# Patient Record
Sex: MALE | Race: OTHER | HISPANIC OR LATINO | URBAN - METROPOLITAN AREA
[De-identification: names, ages, dates, MRNs, and addresses within clinical notes are randomized per-mention and may not be internally consistent; named-entity substitution may affect disease eponyms.]

---

## 2020-03-31 ENCOUNTER — EMERGENCY (EMERGENCY)
Facility: HOSPITAL | Age: 45
LOS: 0 days | Discharge: HOME | End: 2020-03-31
Admitting: EMERGENCY MEDICINE
Payer: SUBSIDIZED

## 2020-03-31 VITALS — HEART RATE: 115 BPM | SYSTOLIC BLOOD PRESSURE: 132 MMHG | DIASTOLIC BLOOD PRESSURE: 89 MMHG

## 2020-03-31 VITALS
OXYGEN SATURATION: 95 % | HEART RATE: 128 BPM | TEMPERATURE: 102 F | DIASTOLIC BLOOD PRESSURE: 99 MMHG | SYSTOLIC BLOOD PRESSURE: 148 MMHG | RESPIRATION RATE: 18 BRPM

## 2020-03-31 DIAGNOSIS — B34.9 VIRAL INFECTION, UNSPECIFIED: ICD-10-CM

## 2020-03-31 DIAGNOSIS — R05 COUGH: ICD-10-CM

## 2020-03-31 PROCEDURE — 71045 X-RAY EXAM CHEST 1 VIEW: CPT | Mod: 26

## 2020-03-31 PROCEDURE — 99284 EMERGENCY DEPT VISIT MOD MDM: CPT

## 2020-03-31 RX ORDER — ACETAMINOPHEN 500 MG
650 TABLET ORAL ONCE
Refills: 0 | Status: COMPLETED | OUTPATIENT
Start: 2020-03-31 | End: 2020-03-31

## 2020-03-31 RX ADMIN — Medication 650 MILLIGRAM(S): at 16:59

## 2020-03-31 NOTE — ED PROVIDER NOTE - PATIENT PORTAL LINK FT
You can access the FollowMyHealth Patient Portal offered by [facility] by registering at the following website: [email or web address]. By joining [employer]’s FollowMyHealth portal, you will also be able to view your health information using other applications (apps) compatible with our system. You can access the FollowMyHealth Patient Portal offered by [facility] by registering at the following website: [email or web address]. By joining [employer]’s FollowMyHealth portal, you will also be able to view your health information using other applications (apps) compatible with our system.

## 2020-03-31 NOTE — ED PROVIDER NOTE - NS ED ROS FT
CONST: see HPI  ENT: No ear pain or discharge, nasal discharge or congestion. No sore throat  CARD: No chest pain, palpitations  RESP: No SOB, cough  GI: No abdominal pain, N/V/D  MS: No joint pain, back pain or extremity pain/injury  SKIN: No rashes  NEURO: No headache, dizziness, paresthesias or LOC

## 2020-03-31 NOTE — ED PROVIDER NOTE - CLINICAL SUMMARY MEDICAL DECISION MAKING FREE TEXT BOX
43yo male presents with fever, cough, ageusia, x 8 days and under-dosing advil. No SOB, no CP. No tachypnea. Well-appearing on exam. Advised continued self-isolation for an additional 7 days and educated on proper dosage of tylenol/advil.

## 2020-03-31 NOTE — ED PROVIDER NOTE - NSFOLLOWUPINSTRUCTIONS_ED_ALL_ED_FT
Novel Coronavirus (COVID-19)  The Facts  What is a coronavirus?  Coronaviruses are a large family of viruses that cause illnesses ranging from the common cold  to more severe diseases such as Middle East Respiratory Syndrome (MERS) and Severe Acute  Respiratory Syndrome (SARS).  What is Novel Coronavirus (COVID-19)?  COVID-19 is a new strain of Coronavirus that has not been previously identified in humans. COVID-19  was identified in [place], [place], [place] in December 2019 (COVID-19). COVID-19 has  since been identified outside of [place], in a growing number of countries internationally, including  the [place].  Where can I find the most recent information about COVID-19?  The Centers for Disease Control and Prevention (CDC) is closely monitoring the outbreak caused by the  COVID-19. For the latest information about COVID- 19, visit the CDC website at  https://www.cdc.gov/coronavirus/index.html  How are coronaviruses spread?  Coronaviruses can be transmitted from person-to- person, usually after close contact with an infected person,  for example, in a household, workplace, or healthcare setting via droplets that become airborne after a cough  or sneeze by an affected person. These droplets can then infect a nearby person. It is likely transmission also  occurs by touching recently contaminated surfaces.  What are the symptoms of coronavirus infection?  It depends on the virus, but common signs include fever and/or respiratory symptoms such as  cough and shortness of breath. In more severe cases, infection can cause pneumonia, severe acute  respiratory syndrome, kidney failure and even death. Fortunately, most cases of COVID-19 have an  illness no different than the influenza “flu”. With a majority of these patients having mild symptoms  and overall mortality which appears to be not much different than the flu.  Is there a treatment for a COVID-19?  There is no specific treatment for disease caused by COVID-19. However, many of the symptoms can  be treated based on the patient’s clinical condition. Supportive care for infected persons can be highly  effective.  What can I do to protect myself?  Washing your hands, covering your cough, and disinfecting surfaces are the best precautionary  measures. It is also advisable to avoid close contact with anyone showing symptoms of respiratory  illness such as coughing and sneezing. Those with symptoms should wear a surgical mask when  around others.  What can I do to protect those around me?  If you have been identified as someone who may be infected with COVID-19, we recommend you  follow the self-isolation procedures outlined below to protect those around you and limit the spread  of this virus.   March 3, 2020  Recommendations for Patients Advised to Self-Isolate  for Possible COVID-19 Exposure  We recommend the below precautionary steps from now until 14 days from when you  returned from your travel or date of your last known possible contact:  - Do not go to work, school, or public areas. Avoid using public transportation, ride-sharing, or  taxis.  - As much as possible, separate yourself from other people in your home. If you can, you should  stay in a room and away from other people in your home. Also, you should use a separate  bathroom, if available.  - Wear the supplied mask whenever you are around other people.  - If you have a non-urgent medical appointment, please reschedule for a later date. If the  appointment is urgent, please call the healthcare provider and tell them that you are on selfisolation for possible COVID-19. This will help the healthcare provider’s office take steps to keep  other people from getting infected or exposed. If you can reschedule routine appointments, do  so.  - Wash your hands often with soap and water for at least 15 to 20 seconds or clean your hands  with an alcohol-based hand  that contains 60 to 95% alcohol, covering all surfaces of  your hands and rubbing them together until they feel dry. Soap and water should be used  preferentially if hands are visibly dirty.  - Cover your mouth and nose with a tissue when you cough or sneeze. Throw used tissues in a  lined trash can; immediately wash your hands.  - Avoid touching your eyes, nose, and mouth with your hands.  - Avoid sharing personal household items. You should not share dishes, drinking glasses, cups,  eating utensils, towels, or bedding with other people or pets in your home. After using these  items, they should be washed thoroughly with soap and water.  - Clean and disinfect all “high-touch” surfaces every day. High touch surfaces include counters,  tabletops, doorknobs, light switches, remote controls, bathroom fixtures, toilets, phones,  keyboards, tablets, and bedside tables. Also, clean any surfaces that may have blood, stool, or  body fluids on them.       If you develop worsening symptoms:  - If you develop worsening symptoms, such as severe shortness of breath, please call [phone] option #9. They will assist you in determining your next steps.  During your time on self-isolation do the following:  - Work from home if you are able to so.  - Limit social isolation by talking with friends and family on the phone or with face-time  - Talk with friends and relatives who don’t live with you about supporting each other if one  household has to be quarantined. For example, agree to drop groceries or other supplies at the  front door.  - Exercise and spend time outdoors away from others if able to do so.    Why didn’t I get tested for novel coronavirus (COVID-19)?  The number of available tests is very limited so strict rules exist for who is allowed to be tested.  [facility] has been authorized to perform testing and is currently working hard to be  able to start providing the test. Such testing is currently reserved for patients who have had  contact with someone infected with the virus, or those who are very sick a plus those who have  traveled to areas identified by the Centers for Disease Control and Prevention (CD) and will  require hospitalization.  What should I do now?  If you are well enough to be discharged home and are not in a high risk group to have  contracted the COVID-10, you should care for yourself at home exactly like you would if you  have Influenza “flu”. Follow all the standard guidelines about washing your hands, covering  your cough, etc.  You should return to the Emergency Department if you develop worse symptoms, trouble  breathing, chest pain, and/or a fever that doesn’t improve with over the counter  acetaminophen or ibuprofen.    You are being discharged with viral illness diagnosis and do not require hospitalization.  At this time, only patients who are being hospitalized are tested for COVID-19.    If you are well enough to be discharged home and are not in a high risk group to be admitted, you should care for yourself at home exactly like you would if you have Influenza “flu”. Follow all the standard guidelines about washing your hands, covering your cough, etc. If you feel unwell, stay home, rest and drink plenty of clear fluids. Keep track of your symptoms. You should return to the Emergency Department if you develop worse symptoms, trouble breathing, chest pain, and/or a fever that doesn’t improve with over the counter medications.    Please consider going through the drive-through testing unless you are severely ill and need to go to the ED.  -through testing is available at various location, including [place].  Call [facility] at  [phone] to make an appointment.    How to Set Up Your Home for Self-Quarantine or Self-Isolation    Please refer to this helpful video.   [email or web address].[email or web address] YOU MAY TAKE ADVIL (MOTRIN, IBUPROFEN) 600MG EVERY 6HRS. YOU MAY ALSO TAKE TYLENOL (ACETAMINOPHEN) 650MG EVERY 6HRS, OR 1000MG EVERY 8HRS.     Novel Coronavirus (COVID-19)  The Facts  What is a coronavirus?  Coronaviruses are a large family of viruses that cause illnesses ranging from the common cold  to more severe diseases such as Middle East Respiratory Syndrome (MERS) and Severe Acute  Respiratory Syndrome (SARS).  What is Novel Coronavirus (COVID-19)?  COVID-19 is a new strain of Coronavirus that has not been previously identified in humans. COVID-19  was identified in [place], [place], [place] in December 2019 (COVID-19). COVID-19 has  since been identified outside of [place], in a growing number of countries internationally, including  the [place].  Where can I find the most recent information about COVID-19?  The Centers for Disease Control and Prevention (CDC) is closely monitoring the outbreak caused by the  COVID-19. For the latest information about COVID- 19, visit the CDC website at  https://www.cdc.gov/coronavirus/index.html  How are coronaviruses spread?  Coronaviruses can be transmitted from person-to- person, usually after close contact with an infected person,  for example, in a household, workplace, or healthcare setting via droplets that become airborne after a cough  or sneeze by an affected person. These droplets can then infect a nearby person. It is likely transmission also  occurs by touching recently contaminated surfaces.  What are the symptoms of coronavirus infection?  It depends on the virus, but common signs include fever and/or respiratory symptoms such as  cough and shortness of breath. In more severe cases, infection can cause pneumonia, severe acute  respiratory syndrome, kidney failure and even death. Fortunately, most cases of COVID-19 have an  illness no different than the influenza “flu”. With a majority of these patients having mild symptoms  and overall mortality which appears to be not much different than the flu.  Is there a treatment for a COVID-19?  There is no specific treatment for disease caused by COVID-19. However, many of the symptoms can  be treated based on the patient’s clinical condition. Supportive care for infected persons can be highly  effective.  What can I do to protect myself?  Washing your hands, covering your cough, and disinfecting surfaces are the best precautionary  measures. It is also advisable to avoid close contact with anyone showing symptoms of respiratory  illness such as coughing and sneezing. Those with symptoms should wear a surgical mask when  around others.  What can I do to protect those around me?  If you have been identified as someone who may be infected with COVID-19, we recommend you  follow the self-isolation procedures outlined below to protect those around you and limit the spread  of this virus.   March 3, 2020  Recommendations for Patients Advised to Self-Isolate  for Possible COVID-19 Exposure  We recommend the below precautionary steps from now until 14 days from when you  returned from your travel or date of your last known possible contact:  - Do not go to work, school, or public areas. Avoid using public transportation, ride-sharing, or  taxis.  - As much as possible, separate yourself from other people in your home. If you can, you should  stay in a room and away from other people in your home. Also, you should use a separate  bathroom, if available.  - Wear the supplied mask whenever you are around other people.  - If you have a non-urgent medical appointment, please reschedule for a later date. If the  appointment is urgent, please call the healthcare provider and tell them that you are on selfisolation for possible COVID-19. This will help the healthcare provider’s office take steps to keep  other people from getting infected or exposed. If you can reschedule routine appointments, do  so.  - Wash your hands often with soap and water for at least 15 to 20 seconds or clean your hands  with an alcohol-based hand  that contains 60 to 95% alcohol, covering all surfaces of  your hands and rubbing them together until they feel dry. Soap and water should be used  preferentially if hands are visibly dirty.  - Cover your mouth and nose with a tissue when you cough or sneeze. Throw used tissues in a  lined trash can; immediately wash your hands.  - Avoid touching your eyes, nose, and mouth with your hands.  - Avoid sharing personal household items. You should not share dishes, drinking glasses, cups,  eating utensils, towels, or bedding with other people or pets in your home. After using these  items, they should be washed thoroughly with soap and water.  - Clean and disinfect all “high-touch” surfaces every day. High touch surfaces include counters,  tabletops, doorknobs, light switches, remote controls, bathroom fixtures, toilets, phones,  keyboards, tablets, and bedside tables. Also, clean any surfaces that may have blood, stool, or  body fluids on them.       If you develop worsening symptoms:  - If you develop worsening symptoms, such as severe shortness of breath, please call [phone] option #9. They will assist you in determining your next steps.  During your time on self-isolation do the following:  - Work from home if you are able to so.  - Limit social isolation by talking with friends and family on the phone or with face-time  - Talk with friends and relatives who don’t live with you about supporting each other if one  household has to be quarantined. For example, agree to drop groceries or other supplies at the  front door.  - Exercise and spend time outdoors away from others if able to do so.    Why didn’t I get tested for novel coronavirus (COVID-19)?  The number of available tests is very limited so strict rules exist for who is allowed to be tested.  [facility] has been authorized to perform testing and is currently working hard to be  able to start providing the test. Such testing is currently reserved for patients who have had  contact with someone infected with the virus, or those who are very sick a plus those who have  traveled to areas identified by the Centers for Disease Control and Prevention (CD) and will  require hospitalization.  What should I do now?  If you are well enough to be discharged home and are not in a high risk group to have  contracted the COVID-10, you should care for yourself at home exactly like you would if you  have Influenza “flu”. Follow all the standard guidelines about washing your hands, covering  your cough, etc.  You should return to the Emergency Department if you develop worse symptoms, trouble  breathing, chest pain, and/or a fever that doesn’t improve with over the counter  acetaminophen or ibuprofen.    You are being discharged with viral illness diagnosis and do not require hospitalization.  At this time, only patients who are being hospitalized are tested for COVID-19.    If you are well enough to be discharged home and are not in a high risk group to be admitted, you should care for yourself at home exactly like you would if you have Influenza “flu”. Follow all the standard guidelines about washing your hands, covering your cough, etc. If you feel unwell, stay home, rest and drink plenty of clear fluids. Keep track of your symptoms. You should return to the Emergency Department if you develop worse symptoms, trouble breathing, chest pain, and/or a fever that doesn’t improve with over the counter medications.    Please consider going through the drive-through testing unless you are severely ill and need to go to the ED.  -through testing is available at various location, including [place].  Call [facility] at  [phone] to make an appointment.    How to Set Up Your Home for Self-Quarantine or Self-Isolation    Please refer to this helpful video.   [email or web address]

## 2020-03-31 NOTE — ED PROVIDER NOTE - PHYSICAL EXAMINATION
CONST: Well appearing in NAD  EYES: PERRL, EOMI, Sclera and conjunctiva clear.   NECK: Non-tender  CARD: Normal S1 S2; Normal rate and rhythm  RESP: Equal BS B/L, No wheezes, rhonchi or rales. No distress  MS: Normal ROM in all extremities. No midline spinal tenderness.  SKIN: Warm, dry, no acute rashes. Good turgor  NEURO: A&Ox3, No focal deficits. Strength 5/5 with no sensory deficits.

## 2025-02-06 ENCOUNTER — EMERGENCY (EMERGENCY)
Facility: HOSPITAL | Age: 50
LOS: 0 days | Discharge: HOME | End: 2025-02-06
Attending: EMERGENCY MEDICINE
Payer: MEDICAID

## 2025-02-06 VITALS
OXYGEN SATURATION: 99 % | WEIGHT: 175.05 LBS | SYSTOLIC BLOOD PRESSURE: 193 MMHG | HEART RATE: 71 BPM | HEIGHT: 67 IN | RESPIRATION RATE: 18 BRPM | TEMPERATURE: 98 F | DIASTOLIC BLOOD PRESSURE: 97 MMHG

## 2025-02-06 DIAGNOSIS — R73.03 PREDIABETES: ICD-10-CM

## 2025-02-06 DIAGNOSIS — R21 RASH AND OTHER NONSPECIFIC SKIN ERUPTION: ICD-10-CM

## 2025-02-06 PROCEDURE — 99284 EMERGENCY DEPT VISIT MOD MDM: CPT

## 2025-02-06 PROCEDURE — 99283 EMERGENCY DEPT VISIT LOW MDM: CPT

## 2025-02-06 RX ORDER — GABAPENTIN 800 MG/1
1 TABLET ORAL
Qty: 10 | Refills: 0
Start: 2025-02-06 | End: 2025-02-10

## 2025-02-06 RX ORDER — VALACYCLOVIR 1000 MG/1
1 TABLET ORAL
Qty: 21 | Refills: 0
Start: 2025-02-06 | End: 2025-02-12

## 2025-02-06 NOTE — ED PROVIDER NOTE - PHYSICAL EXAMINATION
PHYSICAL EXAM: I have reviewed current vital signs.  GENERAL: NAD, well-nourished; well-developed.  HEAD:  Normocephalic, atraumatic.  EYES: Conjunctiva and sclera clear.  ENT: MMM, no erythema/exudates.  CHEST/LUNG: Clear to auscultation bilaterally; no wheezes, rales, or rhonchi.  HEART: Regular rate and rhythm, normal S1 and S2; no murmurs, rubs, or gallops.  ABDOMEN: Soft, nontender, nondistended.  EXTREMITIES:  2+ peripheral pulses; no clubbing, cyanosis, or edema.  PSYCH: Cooperative, appropriate, normal mood and affect.  NEUROLOGY: A&O x 3.   SKIN: Vesicular rash following singular dermatome along right chest to the right mid back; some areas of crusting and some areas of new vesicles.

## 2025-02-06 NOTE — ED PROVIDER NOTE - ATTENDING CONTRIBUTION TO CARE
49-year-old male with past medical history of prediabetes presents with painful rash to right mid chest radiating to the back, started 5 days ago was initially pruritic now painful, vesicular in appearance worsened over the past 2 days.  Patient took Tylenol with minimal relief.  No history of IV drug abuse, states he got chickenpox as a kid, no constitutional symptoms, no weight loss or night sweats.  pt not on any abx, no new detergents, not around weeds, no new pets or foods. No bug bites. denies fever, chills, n/v, cp, sob, pleuritic chest pain, palpitations, diaphoresis, cough, drooling/secretions, trismus, neck swelling, neck stiffness, muffled/change in voice, dysphagia, odynophagia, drainage, trauma, weakness, numbness/tingling, abd pain, diarrhea, constipation, urinary symptoms, ha/lh/dizziness, involvement of palms or soles, gu lesions, sick contacts, recent travel.    on exam: non toxic appearing pt sitting on stretcher speaking full sentences, vesicular lesion to R mid chest radiating to back, does not cross midline, rash in dermatomal pattern, no involvement of mucous membranes, palms or soles, no petechiae, (-) Nikolsky sign, no bleeding, no target lesions, no central clearing, no oropharyngeal edema, uvula midline, no neck swelling- supple, non-tender, RRR, radial pulses 2/4 b/l, ctabl w/ breath sounds present b/l, no wheezing or crackles, good air exchange, good respiratory effort, no accessory muscle use, no tachypnea, no stridor, bs present throughout all 4 quadrants, soft ntnd, no r/g, no hepatolienomegaly, AAOx3. Cn II-XII intact, No focal deficits.    Plan: Meds sent to pharmacy, pt aware of concern for shingles, proper treatment, symptomatic treatment, contact precautions, signs and symptoms to return for and importance and seeing a pmd as pt stopped. Supportive care and home care discussed in detail. Patient aware they may have to return for re-evaluation and possible admission if outpatient treatment fails. Strict return precautions discussed.

## 2025-02-06 NOTE — ED PROVIDER NOTE - DIFFERENTIAL DIAGNOSIS
Differential Diagnosis The differential diagnosis for patients clinical presentation includes but is not limited to:  shingles  allergic reaction  cellulitis  E multiforme  SJS

## 2025-02-06 NOTE — ED PROVIDER NOTE - OBJECTIVE STATEMENT
49-year-old male past medical history of pre-DM presents to the ED complaining of a painful rash to the right chest and back. Patient states rash began 5 days ago; initially itchy, however now painful. Patient states he is still noticing new vesicles over the last 1 to 2 days. Patient has tried Tylenol at home for the pain without relief. Patient has not had any recent fever, difficulty breathing, nausea, or vomiting.

## 2025-02-06 NOTE — ED ADULT NURSE NOTE - CHIEF COMPLAINT QUOTE
Pt c/o rash to RUQ pain spreading to back. c/o pain since then. not crossing midline and rash is crusted.

## 2025-02-06 NOTE — ED PROVIDER NOTE - CLINICAL SUMMARY MEDICAL DECISION MAKING FREE TEXT BOX
pop/soda
49-year-old male with past medical history of prediabetes presents with painful rash to right mid chest radiating to the back, started 5 days ago was initially pruritic now painful, vesicular in appearance worsened over the past 2 days.  Patient took Tylenol with minimal relief.  No history of IV drug abuse, states he got chickenpox as a kid, no constitutional symptoms, no weight loss or night sweats.  pt not on any abx, no new detergents, not around weeds, no new pets or foods. No bug bites. denies fever, chills, n/v, cp, sob, pleuritic chest pain, palpitations, diaphoresis, cough, drooling/secretions, trismus, neck swelling, neck stiffness, muffled/change in voice, dysphagia, odynophagia, drainage, trauma, weakness, numbness/tingling, abd pain, diarrhea, constipation, urinary symptoms, ha/lh/dizziness, involvement of palms or soles, gu lesions, sick contacts, recent travel.    on exam: non toxic appearing pt sitting on stretcher speaking full sentences, vesicular lesion to R mid chest radiating to back, does not cross midline, rash in dermatomal pattern, no involvement of mucous membranes, palms or soles, no petechiae, (-) Nikolsky sign, no bleeding, no target lesions, no central clearing, no oropharyngeal edema, uvula midline, no neck swelling- supple, non-tender, RRR, radial pulses 2/4 b/l, ctabl w/ breath sounds present b/l, no wheezing or crackles, good air exchange, good respiratory effort, no accessory muscle use, no tachypnea, no stridor, bs present throughout all 4 quadrants, soft ntnd, no r/g, no hepatolienomegaly, AAOx3. Cn II-XII intact, No focal deficits.    Plan: Meds sent to pharmacy, pt aware of concern for shingles, proper treatment, symptomatic treatment, contact precautions, signs and symptoms to return for and importance and seeing a pmd as pt stopped. Supportive care and home care discussed in detail. Patient aware they may have to return for re-evaluation and possible admission if outpatient treatment fails. Strict return precautions discussed.      Appropriate medications for patient's presenting complaints were ordered and effects were reassessed.  Escalation to admission/observation was considered. However patient feels much better and is comfortable with discharge.  Appropriate follow-up was arranged.  Supportive care and home care discussed in detail. Patient aware they may have to return for re-evaluation and possible admission if outpatient treatment fails. Strict return precautions discussed.

## 2025-02-06 NOTE — ED PROVIDER NOTE - NSFOLLOWUPINSTRUCTIONS_ED_ALL_ED_FT
Follow up with primary care doctor.   Nuestros coordinadores de referencias del departamento de emergencias se comunicarán con usted en las próximas 24 a  48 horas de 9:00 a. m. a 5:00 p. m. (de lunes a viernes) con angelica micheal de seguimiento. Espere angelica llamada telefónica del hospital en oly período de tiempo. Si no recibe angelica llamada o si tiene alguna pregunta o inquietud, puede comunicarse con ellos al [phone].       Culebrilla  Shingles  A rash with blisters on the skin.  La culebrilla, o herpes zóster, es angelica infección. Produce angelica erupción cutánea y ampollas. Estas zonas infectadas pueden doler mucho.    La culebrilla solo ocurre si:  Ha tenido varicela.  Le love aplicado angelica inyección llamada vacuna para protegerlo de la varicela. La culebrilla es poco frecuente en jennifer vanesa.  ¿Cuáles son las causas?  La culebrilla es causada por un germen llamado virus de la varicela zóster. Jennifer es el mismo germen que causa la varicela. Después de estar expuesto al germen, jennifer permanece en el cuerpo, sada está latente. [place] significa que no está activo.    La culebrilla se produce si el germen se vuelve activo nuevamente. [place] puede ocurrir años después de la primera exposición al germen.    ¿Qué incrementa el riesgo?  Es más probable que tenga culebrilla si:  Es mayor de 60 años.  Está bajo mucho estrés.  Tiene un sistema inmunitario débil. El sistema inmunitario es el sistema de defensa de herrera cuerpo. Puede estar débil si la persona:  Tiene el virus de inmunodeficiencia humana (VIH).  Tiene síndrome de inmunodeficiencia adquirida (sida).  Tiene cáncer.  Saloni medicamentos que debilitan el sistema inmunitario. Estos incluyen los medicamentos para el trasplante de órganos.  ¿Cuáles son los signos o síntomas?  Los primeros síntomas de la culebrilla pueden ser picazón, hormigueo o dolor. Es posible que sienta que le arde la piel.    Unos días o semanas más tarde, tendrá angelica erupción cutánea. [place] es lo que puede esperar:  Es probable que la erupción aparezca en un solo lado del cuerpo.  La erupción puede tener forma de cinturón o cinta. Con el tiempo, se convertirá en ampollas llenas de líquido.  Las ampollas se romperán y se transformarán en costras.  Las costras se secarán en unas 2 o 3 semanas.  También puede tener:  Fiebre.  Escalofríos.  Dolor de mckay.  Náuseas.  ¿Cómo se diagnostica?  La culebrilla se diagnostica con un examen de la piel. Se puede bryn angelica muestra llamada cultivo de angelica de las ampollas y enviarla a un laboratorio. [place] indicará si tiene culebrilla.    ¿Cómo se trata?  La erupción cutánea puede durar varias semanas. No hay price para la culebrilla, sada el médico puede darle medicamentos. Estos medicamentos pueden hacer lo siguiente:  Ayudar a aliviar el dolor.  Ayudar a la picazón.  Ayudar con la irritación y la hinchazón.  Lograr angelica mejoría más pronto.  Prevenir problemas a sanna plazo.  Si la erupción está en la ely, es posible que deba consultar a un oculista o a un médico especialista en nariz, garganta y oído (otorrinolaringólogo).    Siga estas instrucciones en herrera casa:  Medicamentos    Use los medicamentos solamente samira se lo haya indicado el médico.  Use angelica crema para calmar la picazón o cremas anestésicas en la erupción cutánea o las ampollas según las indicaciones del médico.  Para aliviar la picazón y las molestias    A bathtub filled with water.  Para ayudar a la picazón:  Coloque paños fríos y húmedos, llamados compresas frías, sobre la erupción cutánea o las ampollas.  [place] un baño frío. Pruebe agregar bicarbonato de sodio o suman seca en el agua. No se bañe con [place].  Use angelica loción de calamina en la erupción cutánea o las ampollas. Puede conseguir jennifer tipo de loción en la bradley.  Cuidado de las ampollas y la erupción cutánea    Mantenga la hector de la erupción cutánea cubierta con angelica venda floja.  Use ropa holgada que no roce contra la erupción.  Cuídese la erupción cutánea samira se lo haya indicado el médico. Asegúrese de hacer lo siguiente:  Lávese las karyn con agua y jabón anahi al menos 20 segundos antes y después de cambiarse la venda. Si no dispone de agua y jabón, use un desinfectante para karyn.  Mantenga la erupción cutánea y las ampollas limpias lavando la hector con jabón suave y agua fría.  Cámbiese la venda.  Verifique la erupción cutánea todos los días para detectar signos de infección. Esté atento a los siguientes signos:  Aumento del enrojecimiento, la hinchazón o el dolor.  Líquido o darien.  [place].  Pus o mal olor.  No se rasque el área de la erupción. No se toque las ampollas. Para evitar rascarse:  Tenga las uñas siempre cortas y limpias.  Trate de usar guantes o manoplas mientras duerme.  Instrucciones generales    [place].  Lávese las karyn frecuentemente con agua y jabón anahi al menos 20 segundos. Si no dispone de agua y jabón, use un desinfectante para karyn. Lavarse las karyn reduce la probabilidad de contraer angelica infección en la piel.  Herrera infección puede causar varicela en otras personas. Si tiene ampollas que aún no son costras, manténgase alejado de:  Los bebés.  Las embarazadas.  Los niños con eczema.  Personas de edad avanzada que tienen un trasplante de órgano.  Las personas que tienen angelica enfermedad de larga duración, o crónica.  Las personas que no hayan tenido varicela antes.  Las personas que no hayan recibido la vacuna contra la varicela.  ¿Cómo se previene?  Las vacunas son la mejor manera de evitar que contraiga varicela o culebrilla. Hable con herrera médico sobre la aplicación de estas vacunas.    Dónde obtener más información  Centers for Disease Control and Prevention (CDC) (Centros para el Control y la Prevención de Enfermedades): www.cdc.gov  Comuníquese con un médico si:  El dolor no mejora con los medicamentos.  El dolor no mejora después de que se price la erupción cutánea.  Tiene signos de infección alrededor de la erupción cutánea.  La erupción cutánea o las ampollas empeoran.  Tiene fiebre o escalofríos.  Solicite ayuda de inmediato si:  La erupción cutánea está en el tereza o en la nariz.  Tiene dolor en el tereza o cerca de los ojos.  Pierde la sensación en un lado del tereza.  Tiene dificultad para norma.  Siente dolor o zumbido en el oído.  Esta información no tiene samira fin reemplazar el consejo del médico. Asegúrese de hacerle al médico cualquier pregunta que tenga.

## 2025-02-06 NOTE — ED PROVIDER NOTE - PROGRESS NOTE DETAILS
AE: Patient is well appearing. Isolation precautions discussed. Will discharge with strict return precautions. ED Attending ABRAHAM Flores  pt aware of symptomatic treatment, meds, follow up .

## 2025-02-06 NOTE — ED PROVIDER NOTE - PATIENT PORTAL LINK FT
You can access the FollowMyHealth Patient Portal offered by [facility] by registering at the following website: [email or web address]. By joining [facility]’s FollowMyHealth portal, you will also be able to view your health information using other applications (apps) compatible with our system.

## 2025-02-07 PROBLEM — R73.03 PREDIABETES: Chronic | Status: ACTIVE | Noted: 2025-02-06

## 2025-02-07 NOTE — CHART NOTE - NSCHARTNOTEFT_GEN_A_CORE
pt prefers the latest time in the afternoon, sent to MAP chat 2/7 - SILVINA / appt scheduled on 2/28 @ 1:30pm @ [address] Ramos 2/7 - SILVINA (PCP Referral) pt prefers the latest time in the afternoon, sent to MAP chat 2/7 - DK / appt scheduled on 2/28 @ 1:30pm @ [address] Ramos, left  with  to confirm details 2/7 - DK (PCP Referral)

## 2025-02-28 ENCOUNTER — OUTPATIENT (OUTPATIENT)
Dept: OUTPATIENT SERVICES | Facility: HOSPITAL | Age: 50
LOS: 1 days | End: 2025-02-28
Payer: COMMERCIAL

## 2025-02-28 ENCOUNTER — APPOINTMENT (OUTPATIENT)
Dept: INTERNAL MEDICINE | Facility: CLINIC | Age: 50
End: 2025-02-28

## 2025-02-28 ENCOUNTER — NON-APPOINTMENT (OUTPATIENT)
Age: 50
End: 2025-02-28

## 2025-02-28 VITALS
HEIGHT: 67 IN | BODY MASS INDEX: 27.47 KG/M2 | TEMPERATURE: 98.1 F | DIASTOLIC BLOOD PRESSURE: 104 MMHG | SYSTOLIC BLOOD PRESSURE: 163 MMHG | WEIGHT: 175 LBS | OXYGEN SATURATION: 99 % | HEART RATE: 79 BPM

## 2025-02-28 VITALS — SYSTOLIC BLOOD PRESSURE: 165 MMHG | DIASTOLIC BLOOD PRESSURE: 108 MMHG

## 2025-02-28 DIAGNOSIS — Z00.00 ENCOUNTER FOR GENERAL ADULT MEDICAL EXAMINATION WITHOUT ABNORMAL FINDINGS: ICD-10-CM

## 2025-02-28 DIAGNOSIS — Z00.00 ENCOUNTER FOR GENERAL ADULT MEDICAL EXAMINATION W/OUT ABNORMAL FINDINGS: ICD-10-CM

## 2025-02-28 DIAGNOSIS — I10 ESSENTIAL (PRIMARY) HYPERTENSION: ICD-10-CM

## 2025-02-28 DIAGNOSIS — B02.9 ZOSTER W/OUT COMPLICATIONS: ICD-10-CM

## 2025-02-28 PROCEDURE — 99386 PREV VISIT NEW AGE 40-64: CPT

## 2025-02-28 PROCEDURE — 99396 PREV VISIT EST AGE 40-64: CPT

## 2025-02-28 PROCEDURE — T1013: CPT | Mod: 25

## 2025-02-28 RX ORDER — AMLODIPINE BESYLATE 10 MG/1
10 TABLET ORAL
Qty: 30 | Refills: 5 | Status: ACTIVE | COMMUNITY
Start: 2025-02-28 | End: 1900-01-01

## 2025-02-28 RX ORDER — GABAPENTIN 300 MG/1
300 CAPSULE ORAL 3 TIMES DAILY
Qty: 90 | Refills: 1 | Status: ACTIVE | COMMUNITY
Start: 2025-02-28 | End: 1900-01-01

## 2025-02-28 RX ORDER — LIDOCAINE 40 MG/G
4 PATCH TOPICAL DAILY
Qty: 30 | Refills: 5 | Status: ACTIVE | COMMUNITY
Start: 2025-02-28 | End: 1900-01-01

## 2025-03-04 DIAGNOSIS — Z00.00 ENCOUNTER FOR GENERAL ADULT MEDICAL EXAMINATION WITHOUT ABNORMAL FINDINGS: ICD-10-CM

## 2025-03-04 DIAGNOSIS — I10 ESSENTIAL (PRIMARY) HYPERTENSION: ICD-10-CM

## 2025-03-08 ENCOUNTER — OUTPATIENT (OUTPATIENT)
Dept: OUTPATIENT SERVICES | Facility: HOSPITAL | Age: 50
LOS: 1 days | End: 2025-03-08
Payer: SELF-PAY

## 2025-03-08 ENCOUNTER — LABORATORY RESULT (OUTPATIENT)
Age: 50
End: 2025-03-08

## 2025-03-08 DIAGNOSIS — Z00.00 ENCOUNTER FOR GENERAL ADULT MEDICAL EXAMINATION WITHOUT ABNORMAL FINDINGS: ICD-10-CM

## 2025-03-08 LAB
ALBUMIN SERPL ELPH-MCNC: 4.8 G/DL
ALP BLD-CCNC: 126 U/L
ALT SERPL-CCNC: 27 U/L
ANION GAP SERPL CALC-SCNC: 13 MMOL/L
AST SERPL-CCNC: 17 U/L
BASOPHILS # BLD AUTO: 0.16 K/UL
BASOPHILS NFR BLD AUTO: 2 %
BILIRUB SERPL-MCNC: 0.4 MG/DL
BUN SERPL-MCNC: 10 MG/DL
CALCIUM SERPL-MCNC: 9.7 MG/DL
CHLORIDE SERPL-SCNC: 102 MMOL/L
CHOLEST SERPL-MCNC: 228 MG/DL
CO2 SERPL-SCNC: 26 MMOL/L
CREAT SERPL-MCNC: 0.7 MG/DL
EGFR: 114 ML/MIN/1.73M2
EOSINOPHIL # BLD AUTO: 1.85 K/UL
EOSINOPHIL NFR BLD AUTO: 23 %
ESTIMATED AVERAGE GLUCOSE: 243 MG/DL
GLUCOSE SERPL-MCNC: 211 MG/DL
HBA1C MFR BLD HPLC: 10.1 %
HCT VFR BLD CALC: 51.3 %
HDLC SERPL-MCNC: 44 MG/DL
HGB BLD-MCNC: 16.8 G/DL
LDLC SERPL CALC-MCNC: 154 MG/DL
LYMPHOCYTES # BLD AUTO: 3.46 K/UL
LYMPHOCYTES NFR BLD AUTO: 43 %
MAN DIFF?: NORMAL
MCHC RBC-ENTMCNC: 28.1 PG
MCHC RBC-ENTMCNC: 32.7 G/DL
MCV RBC AUTO: 85.8 FL
MONOCYTES # BLD AUTO: 0.32 K/UL
MONOCYTES NFR BLD AUTO: 4 %
NEUTROPHILS # BLD AUTO: 2.01 K/UL
NEUTROPHILS NFR BLD AUTO: 25 %
NONHDLC SERPL-MCNC: 184 MG/DL
PLATELET # BLD AUTO: 212 K/UL
PMV BLD: 11.7 FL
POTASSIUM SERPL-SCNC: 4.3 MMOL/L
PROT SERPL-MCNC: 7.7 G/DL
RBC # BLD: 5.98 M/UL
RBC # FLD: 13.1 %
SODIUM SERPL-SCNC: 141 MMOL/L
TRIGL SERPL-MCNC: 167 MG/DL
TSH SERPL-ACNC: 1.98 UIU/ML
WBC # FLD AUTO: 8.05 K/UL

## 2025-03-08 PROCEDURE — 85025 COMPLETE CBC W/AUTO DIFF WBC: CPT

## 2025-03-08 PROCEDURE — 84443 ASSAY THYROID STIM HORMONE: CPT

## 2025-03-08 PROCEDURE — 80053 COMPREHEN METABOLIC PANEL: CPT

## 2025-03-08 PROCEDURE — 83036 HEMOGLOBIN GLYCOSYLATED A1C: CPT

## 2025-03-08 PROCEDURE — 80061 LIPID PANEL: CPT

## 2025-03-09 DIAGNOSIS — Z00.00 ENCOUNTER FOR GENERAL ADULT MEDICAL EXAMINATION WITHOUT ABNORMAL FINDINGS: ICD-10-CM

## 2025-03-28 ENCOUNTER — APPOINTMENT (OUTPATIENT)
Dept: INTERNAL MEDICINE | Facility: CLINIC | Age: 50
End: 2025-03-28
Payer: COMMERCIAL

## 2025-03-28 ENCOUNTER — OUTPATIENT (OUTPATIENT)
Dept: OUTPATIENT SERVICES | Facility: HOSPITAL | Age: 50
LOS: 1 days | End: 2025-03-28
Payer: COMMERCIAL

## 2025-03-28 VITALS
RESPIRATION RATE: 85 BRPM | TEMPERATURE: 98.2 F | WEIGHT: 170 LBS | OXYGEN SATURATION: 100 % | BODY MASS INDEX: 26.63 KG/M2 | DIASTOLIC BLOOD PRESSURE: 92 MMHG | SYSTOLIC BLOOD PRESSURE: 136 MMHG

## 2025-03-28 VITALS — DIASTOLIC BLOOD PRESSURE: 90 MMHG | SYSTOLIC BLOOD PRESSURE: 132 MMHG

## 2025-03-28 DIAGNOSIS — Z00.00 ENCOUNTER FOR GENERAL ADULT MEDICAL EXAMINATION WITHOUT ABNORMAL FINDINGS: ICD-10-CM

## 2025-03-28 DIAGNOSIS — E78.5 HYPERLIPIDEMIA, UNSPECIFIED: ICD-10-CM

## 2025-03-28 DIAGNOSIS — I10 ESSENTIAL (PRIMARY) HYPERTENSION: ICD-10-CM

## 2025-03-28 DIAGNOSIS — Z00.00 ENCOUNTER FOR GENERAL ADULT MEDICAL EXAMINATION W/OUT ABNORMAL FINDINGS: ICD-10-CM

## 2025-03-28 DIAGNOSIS — B02.9 ZOSTER W/OUT COMPLICATIONS: ICD-10-CM

## 2025-03-28 PROCEDURE — 99214 OFFICE O/P EST MOD 30 MIN: CPT | Mod: GC

## 2025-03-28 PROCEDURE — 99214 OFFICE O/P EST MOD 30 MIN: CPT

## 2025-03-28 RX ORDER — METFORMIN HYDROCHLORIDE 500 MG/1
500 TABLET, COATED ORAL
Qty: 60 | Refills: 3 | Status: ACTIVE | COMMUNITY
Start: 2025-03-28 | End: 1900-01-01

## 2025-03-28 RX ORDER — ATORVASTATIN CALCIUM 40 MG/1
40 TABLET, FILM COATED ORAL
Qty: 90 | Refills: 1 | Status: ACTIVE | COMMUNITY
Start: 2025-03-28 | End: 1900-01-01

## 2025-03-28 RX ORDER — PIOGLITAZONE HYDROCHLORIDE 30 MG/1
30 TABLET ORAL
Qty: 90 | Refills: 1 | Status: ACTIVE | COMMUNITY
Start: 2025-03-28 | End: 1900-01-01

## 2025-04-03 ENCOUNTER — OUTPATIENT (OUTPATIENT)
Dept: OUTPATIENT SERVICES | Facility: HOSPITAL | Age: 50
LOS: 1 days | End: 2025-04-03
Payer: COMMERCIAL

## 2025-04-03 ENCOUNTER — APPOINTMENT (OUTPATIENT)
Dept: PODIATRY | Facility: CLINIC | Age: 50
End: 2025-04-03
Payer: COMMERCIAL

## 2025-04-03 DIAGNOSIS — L08.1 ERYTHRASMA: ICD-10-CM

## 2025-04-03 DIAGNOSIS — E78.5 HYPERLIPIDEMIA, UNSPECIFIED: ICD-10-CM

## 2025-04-03 DIAGNOSIS — Z00.00 ENCOUNTER FOR GENERAL ADULT MEDICAL EXAMINATION WITHOUT ABNORMAL FINDINGS: ICD-10-CM

## 2025-04-03 DIAGNOSIS — I10 ESSENTIAL (PRIMARY) HYPERTENSION: ICD-10-CM

## 2025-04-03 DIAGNOSIS — E11.9 TYPE 2 DIABETES MELLITUS WITHOUT COMPLICATIONS: ICD-10-CM

## 2025-04-03 DIAGNOSIS — B35.1 TINEA UNGUIUM: ICD-10-CM

## 2025-04-03 DIAGNOSIS — E11.9 TYPE 2 DIABETES MELLITUS W/OUT COMPLICATIONS: ICD-10-CM

## 2025-04-03 DIAGNOSIS — E11.8 TYPE 2 DIABETES MELLITUS WITH UNSPECIFIED COMPLICATIONS: ICD-10-CM

## 2025-04-03 PROCEDURE — 99203 OFFICE O/P NEW LOW 30 MIN: CPT

## 2025-04-03 RX ORDER — ERYTHROMYCIN TOPICAL SOLUTION 2% 20 MG/ML
2 SOLUTION TOPICAL TWICE DAILY
Qty: 1 | Refills: 2 | Status: ACTIVE | COMMUNITY
Start: 2025-04-03 | End: 1900-01-01
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
## 2025-04-10 DIAGNOSIS — E11.9 TYPE 2 DIABETES MELLITUS WITHOUT COMPLICATIONS: ICD-10-CM
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
## 2025-04-10 DIAGNOSIS — E11.8 TYPE 2 DIABETES MELLITUS WITH UNSPECIFIED COMPLICATIONS: ICD-10-CM

## 2025-04-10 DIAGNOSIS — L08.1 ERYTHRASMA: ICD-10-CM

## 2025-05-29 ENCOUNTER — APPOINTMENT (OUTPATIENT)
Dept: INTERNAL MEDICINE | Facility: CLINIC | Age: 50
End: 2025-05-29

## 2025-07-21 ENCOUNTER — OUTPATIENT (OUTPATIENT)
Dept: OUTPATIENT SERVICES | Facility: HOSPITAL | Age: 50
LOS: 1 days | End: 2025-07-21
Payer: COMMERCIAL

## 2025-07-21 ENCOUNTER — APPOINTMENT (OUTPATIENT)
Dept: OPHTHALMOLOGY | Facility: CLINIC | Age: 50
End: 2025-07-21
Payer: COMMERCIAL

## 2025-07-21 DIAGNOSIS — H04.123 DRY EYE SYNDROME OF BILATERAL LACRIMAL GLANDS: ICD-10-CM

## 2025-07-21 DIAGNOSIS — H53.8 OTHER VISUAL DISTURBANCES: ICD-10-CM

## 2025-07-21 DIAGNOSIS — E11.9 TYPE 2 DIABETES MELLITUS WITHOUT COMPLICATIONS: ICD-10-CM

## 2025-07-21 DIAGNOSIS — I10 ESSENTIAL (PRIMARY) HYPERTENSION: ICD-10-CM

## 2025-07-21 DIAGNOSIS — H52.4 PRESBYOPIA: ICD-10-CM

## 2025-07-21 PROCEDURE — 92004 COMPRE OPH EXAM NEW PT 1/>: CPT
